# Patient Record
Sex: FEMALE | Race: WHITE | NOT HISPANIC OR LATINO | Employment: FULL TIME | URBAN - NONMETROPOLITAN AREA
[De-identification: names, ages, dates, MRNs, and addresses within clinical notes are randomized per-mention and may not be internally consistent; named-entity substitution may affect disease eponyms.]

---

## 2021-10-14 ENCOUNTER — OFFICE VISIT (OUTPATIENT)
Dept: FAMILY MEDICINE CLINIC | Age: 36
End: 2021-10-14

## 2021-10-14 ENCOUNTER — LAB (OUTPATIENT)
Dept: LAB | Facility: HOSPITAL | Age: 36
End: 2021-10-14

## 2021-10-14 VITALS
TEMPERATURE: 98.2 F | WEIGHT: 221 LBS | SYSTOLIC BLOOD PRESSURE: 105 MMHG | HEIGHT: 69 IN | BODY MASS INDEX: 32.73 KG/M2 | OXYGEN SATURATION: 99 % | DIASTOLIC BLOOD PRESSURE: 34 MMHG | HEART RATE: 70 BPM

## 2021-10-14 DIAGNOSIS — N94.6 DYSMENORRHEA: Primary | ICD-10-CM

## 2021-10-14 DIAGNOSIS — N94.6 DYSMENORRHEA: ICD-10-CM

## 2021-10-14 LAB
BASOPHILS # BLD AUTO: 0.05 10*3/MM3 (ref 0–0.2)
BASOPHILS NFR BLD AUTO: 0.5 % (ref 0–1.5)
DEPRECATED RDW RBC AUTO: 43.2 FL (ref 37–54)
EOSINOPHIL # BLD AUTO: 0.23 10*3/MM3 (ref 0–0.4)
EOSINOPHIL NFR BLD AUTO: 2.4 % (ref 0.3–6.2)
ERYTHROCYTE [DISTWIDTH] IN BLOOD BY AUTOMATED COUNT: 13 % (ref 12.3–15.4)
HCT VFR BLD AUTO: 28.5 % (ref 34–46.6)
HGB BLD-MCNC: 9.4 G/DL (ref 12–15.9)
IMM GRANULOCYTES # BLD AUTO: 0.01 10*3/MM3 (ref 0–0.05)
IMM GRANULOCYTES NFR BLD AUTO: 0.1 % (ref 0–0.5)
LYMPHOCYTES # BLD AUTO: 2.42 10*3/MM3 (ref 0.7–3.1)
LYMPHOCYTES NFR BLD AUTO: 24.9 % (ref 19.6–45.3)
MCH RBC QN AUTO: 29.5 PG (ref 26.6–33)
MCHC RBC AUTO-ENTMCNC: 33 G/DL (ref 31.5–35.7)
MCV RBC AUTO: 89.3 FL (ref 79–97)
MONOCYTES # BLD AUTO: 0.82 10*3/MM3 (ref 0.1–0.9)
MONOCYTES NFR BLD AUTO: 8.4 % (ref 5–12)
NEUTROPHILS NFR BLD AUTO: 6.18 10*3/MM3 (ref 1.7–7)
NEUTROPHILS NFR BLD AUTO: 63.7 % (ref 42.7–76)
PLATELET # BLD AUTO: 345 10*3/MM3 (ref 140–450)
PMV BLD AUTO: 9.4 FL (ref 6–12)
RBC # BLD AUTO: 3.19 10*6/MM3 (ref 3.77–5.28)
WBC # BLD AUTO: 9.71 10*3/MM3 (ref 3.4–10.8)

## 2021-10-14 PROCEDURE — 85025 COMPLETE CBC W/AUTO DIFF WBC: CPT

## 2021-10-14 PROCEDURE — 99203 OFFICE O/P NEW LOW 30 MIN: CPT | Performed by: NURSE PRACTITIONER

## 2021-10-14 PROCEDURE — 36415 COLL VENOUS BLD VENIPUNCTURE: CPT

## 2021-10-14 RX ORDER — PROGESTERONE 200 MG/1
200 CAPSULE ORAL DAILY
COMMUNITY
End: 2021-10-14 | Stop reason: SINTOL

## 2021-10-14 RX ORDER — FERROUS SULFATE 325(65) MG
325 TABLET ORAL
Qty: 30 TABLET | Refills: 1 | Status: SHIPPED | OUTPATIENT
Start: 2021-10-14 | End: 2023-02-23

## 2021-10-14 NOTE — PROGRESS NOTES
"Chief Complaint  Establish Care (has not had PCP in 10 years), Allergic Reaction, and Leg Pain    Subjective    Patient is a 35 year old female in today for complaints of excessive vaginal bleeding, resent leg swilling and dizziness. Patient report that she has been bleeding continually since November 2020. OB was going to do a partial hysterectomy that has been delayed due to covid. Patient reports speaking with OB MD who prescribed progesterone. Patient reports after taking the medication for a couple of days her legs began to swell and burn. Patient stopped the medication and the swelling has resolved and the burning is much improved. Patient reports really heavy bleeding the past couple of days after stopping the medication accompanied with dizziness.           Annemarie Luna presents to [facility]  Leg Pain   The incident occurred 5 to 7 days ago. There was no injury mechanism. The pain is present in the right leg and left leg. The quality of the pain is described as burning. The pain is at a severity of 5/10. The pain is moderate. The pain has been improving since onset. Pertinent negatives include no inability to bear weight or numbness. She reports no foreign bodies present. Nothing aggravates the symptoms. She has tried immobilization for the symptoms. The treatment provided mild relief.   Dysmenorrhea  This is a recurrent problem. The current episode started more than 1 year ago. The problem has been gradually worsening. Associated symptoms include fatigue and myalgias. Pertinent negatives include no numbness. Nothing aggravates the symptoms. She has tried rest for the symptoms. The treatment provided mild relief.       Objective   Vital Signs:   BP (!) 105/34 (BP Location: Left arm, Patient Position: Sitting, Cuff Size: Large Adult)   Pulse 70   Temp 98.2 °F (36.8 °C) (Oral)   Ht 175.3 cm (69\")   Wt 100 kg (221 lb)   SpO2 99%   BMI 32.64 kg/m²     Physical " Exam  HENT:      Head: Normocephalic.   Cardiovascular:      Rate and Rhythm: Normal rate and regular rhythm.      Pulses: Normal pulses.      Heart sounds: Normal heart sounds.   Pulmonary:      Effort: Pulmonary effort is normal.      Breath sounds: Normal breath sounds.   Abdominal:      General: Abdomen is flat. Bowel sounds are normal. There is no distension.      Palpations: There is no mass.      Tenderness: There is abdominal tenderness.   Skin:     General: Skin is warm and dry.   Neurological:      Mental Status: She is alert and oriented to person, place, and time.   Psychiatric:         Mood and Affect: Mood normal.        Result Review :                 Assessment and Plan    Diagnoses and all orders for this visit:    1. Dysmenorrhea (Primary)  -     CBC w AUTO Differential; Future  -     ferrous sulfate (FerrouSul) 325 (65 FE) MG tablet; Take 1 tablet by mouth Daily With Breakfast.  Dispense: 30 tablet; Refill: 1        Follow Up   No follow-ups on file.  Patient was given instructions and counseling regarding her condition or for health maintenance advice. Please see specific information pulled into the AVS if appropriate.

## 2021-10-14 NOTE — PROGRESS NOTES
Hemoglobin low at 9.4, hematocrit 28.5, RBCs 3.19 indicative of anemia but does not warrant blood transfusion at this time. Blood loss source identified. Start iron supplement and keep scheduled appointment with OBGYN. ER for increased bleeding or dizziness.

## 2021-10-26 PROBLEM — N92.0 MENORRHAGIA: Status: ACTIVE | Noted: 2021-10-26

## 2021-11-05 ENCOUNTER — TRANSCRIBE ORDERS (OUTPATIENT)
Dept: FAMILY MEDICINE CLINIC | Age: 36
End: 2021-11-05

## 2021-11-05 DIAGNOSIS — Z01.818 PRE-OPERATIVE CLEARANCE: Primary | ICD-10-CM

## 2023-02-23 ENCOUNTER — OFFICE VISIT (OUTPATIENT)
Dept: FAMILY MEDICINE CLINIC | Age: 38
End: 2023-02-23
Payer: COMMERCIAL

## 2023-02-23 VITALS
HEART RATE: 73 BPM | BODY MASS INDEX: 32.5 KG/M2 | SYSTOLIC BLOOD PRESSURE: 137 MMHG | OXYGEN SATURATION: 98 % | WEIGHT: 219.4 LBS | TEMPERATURE: 97.7 F | DIASTOLIC BLOOD PRESSURE: 83 MMHG | HEIGHT: 69 IN

## 2023-02-23 DIAGNOSIS — R06.02 SHORTNESS OF BREATH: Primary | ICD-10-CM

## 2023-02-23 PROCEDURE — 99213 OFFICE O/P EST LOW 20 MIN: CPT | Performed by: NURSE PRACTITIONER

## 2023-02-23 NOTE — PROGRESS NOTES
"Chief Complaint  Shortness of Breath (Sx started August of 22 after having bronchitis ) and Dry Mouth (Sx started a month ago )    Subjective    Patient is in today with complaints of shortness of breath and dry mouth.  Patient reports having bronchitis back in August 2022, and has had ongoing shortness of breath since that time.  Patient reports snoring.  Patient is asking to have a sleep study done.        Annemarie Luna presents to [facility]  Shortness of Breath  This is a recurrent problem. The problem has been waxing and waning. Associated symptoms include rhinorrhea. Pertinent negatives include no chest pain, fever or sputum production. Nothing aggravates the symptoms. She has tried ipratropium inhalers for the symptoms. The treatment provided mild relief. Her past medical history is significant for allergies.       Objective   Vital Signs:  /83 (BP Location: Right arm, Patient Position: Sitting, Cuff Size: Adult)   Pulse 73   Temp 97.7 °F (36.5 °C) (Oral)   Ht 175.3 cm (69\")   Wt 99.5 kg (219 lb 6.4 oz)   SpO2 98% Comment: room air  BMI 32.40 kg/m²   Estimated body mass index is 32.4 kg/m² as calculated from the following:    Height as of this encounter: 175.3 cm (69\").    Weight as of this encounter: 99.5 kg (219 lb 6.4 oz).             Physical Exam  HENT:      Head: Normocephalic.   Cardiovascular:      Rate and Rhythm: Normal rate and regular rhythm.   Pulmonary:      Effort: Pulmonary effort is normal. No respiratory distress.      Breath sounds: Normal breath sounds. No stridor. No wheezing, rhonchi or rales.   Skin:     General: Skin is warm and dry.   Neurological:      Mental Status: She is alert and oriented to person, place, and time.   Psychiatric:         Mood and Affect: Mood normal.        Result Review :                   Assessment and Plan   Diagnoses and all orders for this visit:    1. Shortness of breath (Primary)  Comments:  O2 good, lungs " clear, will order sleep study to assess for sleep apnea   Orders:  -     Ambulatory Referral to Sleep Medicine             Follow Up   Return if symptoms worsen or fail to improve.  Patient was given instructions and counseling regarding her condition or for health maintenance advice. Please see specific information pulled into the AVS if appropriate.

## 2023-03-30 ENCOUNTER — OFFICE VISIT (OUTPATIENT)
Dept: FAMILY MEDICINE CLINIC | Age: 38
End: 2023-03-30
Payer: COMMERCIAL

## 2023-03-30 VITALS
TEMPERATURE: 97.6 F | DIASTOLIC BLOOD PRESSURE: 55 MMHG | SYSTOLIC BLOOD PRESSURE: 109 MMHG | WEIGHT: 225.4 LBS | BODY MASS INDEX: 33.38 KG/M2 | HEIGHT: 69 IN | HEART RATE: 63 BPM | OXYGEN SATURATION: 98 %

## 2023-03-30 DIAGNOSIS — R06.02 SHORTNESS OF BREATH: ICD-10-CM

## 2023-03-30 DIAGNOSIS — R63.5 WEIGHT GAIN: Primary | ICD-10-CM

## 2023-03-30 DIAGNOSIS — Z13.6 SCREENING FOR CARDIOVASCULAR CONDITION: ICD-10-CM

## 2023-03-30 PROBLEM — Z90.710 HISTORY OF HYSTERECTOMY: Status: ACTIVE | Noted: 2023-03-30

## 2023-03-30 PROCEDURE — 99214 OFFICE O/P EST MOD 30 MIN: CPT | Performed by: NURSE PRACTITIONER

## 2023-03-30 NOTE — PROGRESS NOTES
Chief Complaint  Annemarie Luna presents to [facility] for Establish Care    Subjective          History of Present Illness    Annemarie is here today with c/o struggles with her weight. She feels that she eats healthy and exercises but does not lose any weight. She does not eat out except for once per week. She is not currently counting calories. Eats lots of meats and veggies. She exercises at gym about twice per week. She also does some exercise videos at home.   She was diagnosed with bronchitis 2022. She reports that it persisted for several months. She has been referred for sleep study by another provider for c/o SOA. She has been using Albuterol inhaler with some improvement. She has some SOA with exercise as well. She reports that she had chest xray at [facility] 2022. She was told that she did not have PNA. She does note some wheezing at times.    Review of Systems      No Known Allergies   History reviewed. No pertinent past medical history.  No current outpatient medications on file.     No current facility-administered medications for this visit.     Past Surgical History:   Procedure Laterality Date   • CHOLECYSTECTOMY     • HYSTERECTOMY     • LUMBAR DISCECTOMY        Social History     Tobacco Use   • Smoking status: Former     Years: 15.00     Types: Cigarettes     Quit date: 6/15/2016     Years since quittin.7     Passive exposure: Past   • Smokeless tobacco: Never   Vaping Use   • Vaping Use: Never used   Substance Use Topics   • Alcohol use: Yes     Comment: 2 glasses of wine per month   • Drug use: Never     Family History   Problem Relation Age of Onset   • No Known Problems Mother    • No Known Problems Father    • Diabetes Maternal Grandfather      Health Maintenance Due   Topic Date Due   • HEPATITIS C SCREENING  Never done   • ANNUAL PHYSICAL  Never done        There is no immunization history on file for this patient.     Objective  "    Vitals:    03/30/23 1103   BP: 109/55   BP Location: Left arm   Patient Position: Sitting   Cuff Size: Large Adult   Pulse: 63   Temp: 97.6 °F (36.4 °C)   TempSrc: Oral   SpO2: 98%   Weight: 102 kg (225 lb 6.4 oz)   Height: 175.3 cm (69\")     Body mass index is 33.29 kg/m².     Physical Exam  Vitals reviewed.   Constitutional:       General: She is not in acute distress.     Appearance: Normal appearance. She is well-developed.   HENT:      Head: Normocephalic and atraumatic.   Cardiovascular:      Rate and Rhythm: Normal rate and regular rhythm.   Pulmonary:      Effort: Pulmonary effort is normal.      Breath sounds: Normal breath sounds.   Neurological:      Mental Status: She is alert and oriented to person, place, and time.   Psychiatric:         Mood and Affect: Mood and affect normal.           Result Review :                               Assessment and Plan      Diagnoses and all orders for this visit:    1. Weight gain (Primary)  Comments:  Discussed healthy diet and exercise. Food journal. Consider dietician referral.   Orders:  -     TSH; Future    2. Screening for cardiovascular condition  -     Comprehensive metabolic panel; Future  -     Lipid panel; Future  -     CBC No Differential; Future    3. Shortness of breath  Comments:  Will repeat chest xray with persistent SOA.   Orders:  -     XR Chest PA & Lateral; Future              Follow Up     Return for Annual physical.             "

## 2023-04-10 ENCOUNTER — TELEPHONE (OUTPATIENT)
Dept: FAMILY MEDICINE CLINIC | Age: 38
End: 2023-04-10
Payer: COMMERCIAL